# Patient Record
Sex: MALE | Race: WHITE | Employment: OTHER | URBAN - METROPOLITAN AREA
[De-identification: names, ages, dates, MRNs, and addresses within clinical notes are randomized per-mention and may not be internally consistent; named-entity substitution may affect disease eponyms.]

---

## 2017-01-01 ENCOUNTER — TELEPHONE (OUTPATIENT)
Dept: CARDIOLOGY CLINIC | Age: 72
End: 2017-01-01

## 2017-01-01 ENCOUNTER — APPOINTMENT (OUTPATIENT)
Dept: CT IMAGING | Age: 72
DRG: 064 | End: 2017-01-01
Attending: FAMILY MEDICINE
Payer: MEDICARE

## 2017-01-01 ENCOUNTER — OFFICE VISIT (OUTPATIENT)
Dept: CARDIOLOGY CLINIC | Age: 72
End: 2017-01-01

## 2017-01-01 ENCOUNTER — APPOINTMENT (OUTPATIENT)
Dept: CT IMAGING | Age: 72
DRG: 064 | End: 2017-01-01
Attending: EMERGENCY MEDICINE
Payer: MEDICARE

## 2017-01-01 ENCOUNTER — APPOINTMENT (OUTPATIENT)
Dept: GENERAL RADIOLOGY | Age: 72
DRG: 064 | End: 2017-01-01
Attending: EMERGENCY MEDICINE
Payer: MEDICARE

## 2017-01-01 ENCOUNTER — HOSPITAL ENCOUNTER (INPATIENT)
Age: 72
LOS: 1 days | DRG: 064 | End: 2017-06-02
Attending: EMERGENCY MEDICINE | Admitting: FAMILY MEDICINE
Payer: MEDICARE

## 2017-01-01 VITALS
DIASTOLIC BLOOD PRESSURE: 78 MMHG | BODY MASS INDEX: 30.61 KG/M2 | RESPIRATION RATE: 18 BRPM | SYSTOLIC BLOOD PRESSURE: 136 MMHG | HEIGHT: 67 IN | OXYGEN SATURATION: 98 % | HEART RATE: 84 BPM | WEIGHT: 195 LBS

## 2017-01-01 VITALS
SYSTOLIC BLOOD PRESSURE: 62 MMHG | BODY MASS INDEX: 29.05 KG/M2 | DIASTOLIC BLOOD PRESSURE: 37 MMHG | TEMPERATURE: 98.1 F | OXYGEN SATURATION: 80 % | WEIGHT: 177.25 LBS

## 2017-01-01 DIAGNOSIS — Z79.01 LONG TERM (CURRENT) USE OF ANTICOAGULANTS: ICD-10-CM

## 2017-01-01 DIAGNOSIS — I71.21 ANEURYSM, ASCENDING AORTA: ICD-10-CM

## 2017-01-01 DIAGNOSIS — S06.5XAA SDH (SUBDURAL HEMATOMA): Primary | ICD-10-CM

## 2017-01-01 DIAGNOSIS — I35.9 AORTIC VALVE DISORDER: ICD-10-CM

## 2017-01-01 DIAGNOSIS — Z95.2 S/P AVR (AORTIC VALVE REPLACEMENT): Primary | ICD-10-CM

## 2017-01-01 LAB
ANION GAP BLD CALC-SCNC: 8 MMOL/L (ref 5–15)
APPEARANCE UR: CLEAR
APPEARANCE UR: CLEAR
ARTERIAL PATENCY WRIST A: YES
ATRIAL RATE: 115 BPM
ATRIAL RATE: 125 BPM
BACTERIA URNS QL MICRO: NEGATIVE /HPF
BACTERIA URNS QL MICRO: NEGATIVE /HPF
BASE DEFICIT BLD-SCNC: 5 MMOL/L
BDY SITE: ABNORMAL
BILIRUB UR QL: NEGATIVE
BILIRUB UR QL: NEGATIVE
BUN SERPL-MCNC: 13 MG/DL (ref 6–20)
BUN/CREAT SERPL: 14 (ref 12–20)
CALCIUM SERPL-MCNC: 8.7 MG/DL (ref 8.5–10.1)
CALCULATED P AXIS, ECG09: 58 DEGREES
CALCULATED P AXIS, ECG09: 64 DEGREES
CALCULATED R AXIS, ECG10: 90 DEGREES
CALCULATED R AXIS, ECG10: 98 DEGREES
CALCULATED T AXIS, ECG11: 67 DEGREES
CALCULATED T AXIS, ECG11: 72 DEGREES
CHLORIDE SERPL-SCNC: 115 MMOL/L (ref 97–108)
CO2 SERPL-SCNC: 29 MMOL/L (ref 21–32)
COLOR UR: ABNORMAL
COLOR UR: ABNORMAL
CREAT SERPL-MCNC: 0.94 MG/DL (ref 0.7–1.3)
DIAGNOSIS, 93000: NORMAL
DIAGNOSIS, 93000: NORMAL
EPITH CASTS URNS QL MICRO: ABNORMAL /LPF
EPITH CASTS URNS QL MICRO: ABNORMAL /LPF
ERYTHROCYTE [DISTWIDTH] IN BLOOD BY AUTOMATED COUNT: 15.9 % (ref 11.5–14.5)
GAS FLOW.O2 O2 DELIVERY SYS: ABNORMAL L/MIN
GAS FLOW.O2 SETTING OXYMISER: 14 BPM
GLUCOSE SERPL-MCNC: 117 MG/DL (ref 65–100)
GLUCOSE UR STRIP.AUTO-MCNC: >1000 MG/DL
GLUCOSE UR STRIP.AUTO-MCNC: NEGATIVE MG/DL
HCO3 BLD-SCNC: 20.9 MMOL/L (ref 22–26)
HCT VFR BLD AUTO: 45.2 % (ref 36.6–50.3)
HGB BLD-MCNC: 14.9 G/DL (ref 12.1–17)
HGB UR QL STRIP: ABNORMAL
HGB UR QL STRIP: ABNORMAL
HYALINE CASTS URNS QL MICRO: ABNORMAL /LPF (ref 0–5)
KETONES UR QL STRIP.AUTO: 15 MG/DL
KETONES UR QL STRIP.AUTO: NEGATIVE MG/DL
LEUKOCYTE ESTERASE UR QL STRIP.AUTO: NEGATIVE
LEUKOCYTE ESTERASE UR QL STRIP.AUTO: NEGATIVE
MCH RBC QN AUTO: 28.6 PG (ref 26–34)
MCHC RBC AUTO-ENTMCNC: 33 G/DL (ref 30–36.5)
MCV RBC AUTO: 86.8 FL (ref 80–99)
NITRITE UR QL STRIP.AUTO: NEGATIVE
NITRITE UR QL STRIP.AUTO: NEGATIVE
O2/TOTAL GAS SETTING VFR VENT: 50 %
OSMOLALITY SERPL: 322 MOSM/KG H2O
OSMOLALITY UR: 55 MOSM/KG H2O
P-R INTERVAL, ECG05: 158 MS
P-R INTERVAL, ECG05: 162 MS
PCO2 BLD: 41.8 MMHG (ref 35–45)
PEEP RESPIRATORY: 5 CMH2O
PH BLD: 7.31 [PH] (ref 7.35–7.45)
PH UR STRIP: 6 [PH] (ref 5–8)
PH UR STRIP: 6.5 [PH] (ref 5–8)
PLATELET # BLD AUTO: 247 K/UL (ref 150–400)
PO2 BLD: 56 MMHG (ref 80–100)
POTASSIUM SERPL-SCNC: 4.1 MMOL/L (ref 3.5–5.1)
PROT UR STRIP-MCNC: NEGATIVE MG/DL
PROT UR STRIP-MCNC: NEGATIVE MG/DL
Q-T INTERVAL, ECG07: 324 MS
Q-T INTERVAL, ECG07: 396 MS
QRS DURATION, ECG06: 100 MS
QRS DURATION, ECG06: 96 MS
QTC CALCULATION (BEZET), ECG08: 467 MS
QTC CALCULATION (BEZET), ECG08: 547 MS
RBC # BLD AUTO: 5.21 M/UL (ref 4.1–5.7)
RBC #/AREA URNS HPF: ABNORMAL /HPF (ref 0–5)
RBC #/AREA URNS HPF: ABNORMAL /HPF (ref 0–5)
SAO2 % BLD: 86 % (ref 92–97)
SODIUM SERPL-SCNC: 152 MMOL/L (ref 136–145)
SODIUM UR-SCNC: 15 MMOL/L
SP GR UR REFRACTOMETRY: 1.01 (ref 1–1.03)
SP GR UR REFRACTOMETRY: 1.02 (ref 1–1.03)
SPECIMEN TYPE: ABNORMAL
TROPONIN I SERPL-MCNC: 0.08 NG/ML
TROPONIN I SERPL-MCNC: 0.08 NG/ML
UA: UC IF INDICATED,UAUC: ABNORMAL
UROBILINOGEN UR QL STRIP.AUTO: 0.2 EU/DL (ref 0.2–1)
UROBILINOGEN UR QL STRIP.AUTO: 0.2 EU/DL (ref 0.2–1)
VENTILATION MODE VENT: ABNORMAL
VENTRICULAR RATE, ECG03: 115 BPM
VENTRICULAR RATE, ECG03: 125 BPM
VT SETTING VENT: 500 ML
WBC # BLD AUTO: 14.8 K/UL (ref 4.1–11.1)
WBC URNS QL MICRO: ABNORMAL /HPF (ref 0–4)
WBC URNS QL MICRO: ABNORMAL /HPF (ref 0–4)

## 2017-01-01 PROCEDURE — 74011000258 HC RX REV CODE- 258: Performed by: FAMILY MEDICINE

## 2017-01-01 PROCEDURE — 84484 ASSAY OF TROPONIN QUANT: CPT | Performed by: FAMILY MEDICINE

## 2017-01-01 PROCEDURE — 74011000258 HC RX REV CODE- 258: Performed by: HOSPITALIST

## 2017-01-01 PROCEDURE — 84588 ASSAY OF VASOPRESSIN: CPT | Performed by: FAMILY MEDICINE

## 2017-01-01 PROCEDURE — 74011250636 HC RX REV CODE- 250/636: Performed by: INTERNAL MEDICINE

## 2017-01-01 PROCEDURE — 77030032490 HC SLV COMPR SCD KNE COVD -B

## 2017-01-01 PROCEDURE — 93306 TTE W/DOPPLER COMPLETE: CPT

## 2017-01-01 PROCEDURE — 81001 URINALYSIS AUTO W/SCOPE: CPT | Performed by: FAMILY MEDICINE

## 2017-01-01 PROCEDURE — 71010 XR CHEST PORT: CPT

## 2017-01-01 PROCEDURE — 96365 THER/PROPH/DIAG IV INF INIT: CPT

## 2017-01-01 PROCEDURE — 0BH17EZ INSERTION OF ENDOTRACHEAL AIRWAY INTO TRACHEA, VIA NATURAL OR ARTIFICIAL OPENING: ICD-10-PCS | Performed by: HOSPITALIST

## 2017-01-01 PROCEDURE — 74011250636 HC RX REV CODE- 250/636: Performed by: FAMILY MEDICINE

## 2017-01-01 PROCEDURE — 74011250636 HC RX REV CODE- 250/636: Performed by: EMERGENCY MEDICINE

## 2017-01-01 PROCEDURE — 65610000006 HC RM INTENSIVE CARE

## 2017-01-01 PROCEDURE — 82803 BLOOD GASES ANY COMBINATION: CPT

## 2017-01-01 PROCEDURE — 74011000250 HC RX REV CODE- 250: Performed by: EMERGENCY MEDICINE

## 2017-01-01 PROCEDURE — 74011250636 HC RX REV CODE- 250/636

## 2017-01-01 PROCEDURE — 74011250636 HC RX REV CODE- 250/636: Performed by: HOSPITALIST

## 2017-01-01 PROCEDURE — 96375 TX/PRO/DX INJ NEW DRUG ADDON: CPT

## 2017-01-01 PROCEDURE — 93005 ELECTROCARDIOGRAM TRACING: CPT

## 2017-01-01 PROCEDURE — 70450 CT HEAD/BRAIN W/O DYE: CPT

## 2017-01-01 PROCEDURE — 80048 BASIC METABOLIC PNL TOTAL CA: CPT | Performed by: FAMILY MEDICINE

## 2017-01-01 PROCEDURE — 83935 ASSAY OF URINE OSMOLALITY: CPT | Performed by: FAMILY MEDICINE

## 2017-01-01 PROCEDURE — 81001 URINALYSIS AUTO W/SCOPE: CPT | Performed by: EMERGENCY MEDICINE

## 2017-01-01 PROCEDURE — 84300 ASSAY OF URINE SODIUM: CPT | Performed by: FAMILY MEDICINE

## 2017-01-01 PROCEDURE — 83930 ASSAY OF BLOOD OSMOLALITY: CPT | Performed by: FAMILY MEDICINE

## 2017-01-01 PROCEDURE — 36415 COLL VENOUS BLD VENIPUNCTURE: CPT | Performed by: FAMILY MEDICINE

## 2017-01-01 PROCEDURE — 5A1935Z RESPIRATORY VENTILATION, LESS THAN 24 CONSECUTIVE HOURS: ICD-10-PCS | Performed by: HOSPITALIST

## 2017-01-01 PROCEDURE — 94002 VENT MGMT INPAT INIT DAY: CPT

## 2017-01-01 PROCEDURE — 85027 COMPLETE CBC AUTOMATED: CPT | Performed by: FAMILY MEDICINE

## 2017-01-01 PROCEDURE — 94003 VENT MGMT INPAT SUBQ DAY: CPT

## 2017-01-01 PROCEDURE — 99285 EMERGENCY DEPT VISIT HI MDM: CPT

## 2017-01-01 PROCEDURE — 74011000258 HC RX REV CODE- 258: Performed by: EMERGENCY MEDICINE

## 2017-01-01 PROCEDURE — 36600 WITHDRAWAL OF ARTERIAL BLOOD: CPT

## 2017-01-01 RX ORDER — POTASSIUM CHLORIDE 14.9 MG/ML
10 INJECTION INTRAVENOUS
Status: COMPLETED | OUTPATIENT
Start: 2017-01-01 | End: 2017-01-01

## 2017-01-01 RX ORDER — ZOLPIDEM TARTRATE 5 MG/1
TABLET ORAL
COMMUNITY

## 2017-01-01 RX ORDER — MORPHINE SULFATE 2 MG/ML
2 INJECTION, SOLUTION INTRAMUSCULAR; INTRAVENOUS
Status: DISCONTINUED | OUTPATIENT
Start: 2017-01-01 | End: 2017-01-01 | Stop reason: HOSPADM

## 2017-01-01 RX ORDER — SODIUM CHLORIDE 450 MG/100ML
1000 INJECTION, SOLUTION INTRAVENOUS ONCE
Status: COMPLETED | OUTPATIENT
Start: 2017-01-01 | End: 2017-01-01

## 2017-01-01 RX ORDER — ATORVASTATIN CALCIUM 10 MG/1
TABLET, FILM COATED ORAL DAILY
COMMUNITY

## 2017-01-01 RX ORDER — PROPOFOL 10 MG/ML
5-50 VIAL (ML) INTRAVENOUS
Status: DISCONTINUED | OUTPATIENT
Start: 2017-01-01 | End: 2017-01-01

## 2017-01-01 RX ORDER — DEXTROSE MONOHYDRATE 50 MG/ML
75 INJECTION, SOLUTION INTRAVENOUS CONTINUOUS
Status: DISCONTINUED | OUTPATIENT
Start: 2017-01-01 | End: 2017-01-01 | Stop reason: HOSPADM

## 2017-01-01 RX ORDER — ONDANSETRON 2 MG/ML
4 INJECTION INTRAMUSCULAR; INTRAVENOUS
Status: DISCONTINUED | OUTPATIENT
Start: 2017-01-01 | End: 2017-01-01 | Stop reason: HOSPADM

## 2017-01-01 RX ORDER — SODIUM CHLORIDE 0.9 % (FLUSH) 0.9 %
5 SYRINGE (ML) INJECTION EVERY 8 HOURS
Status: DISCONTINUED | OUTPATIENT
Start: 2017-01-01 | End: 2017-01-01 | Stop reason: HOSPADM

## 2017-01-01 RX ORDER — FENTANYL CITRATE 50 UG/ML
50 INJECTION, SOLUTION INTRAMUSCULAR; INTRAVENOUS
Status: DISCONTINUED | OUTPATIENT
Start: 2017-01-01 | End: 2017-01-01

## 2017-01-01 RX ORDER — SODIUM CHLORIDE 9 MG/ML
75 INJECTION, SOLUTION INTRAVENOUS CONTINUOUS
Status: DISCONTINUED | OUTPATIENT
Start: 2017-01-01 | End: 2017-01-01

## 2017-01-01 RX ORDER — LORAZEPAM 2 MG/ML
1 INJECTION INTRAMUSCULAR
Status: DISCONTINUED | OUTPATIENT
Start: 2017-01-01 | End: 2017-01-01 | Stop reason: HOSPADM

## 2017-01-01 RX ORDER — FENTANYL CITRATE 50 UG/ML
100 INJECTION, SOLUTION INTRAMUSCULAR; INTRAVENOUS ONCE
Status: COMPLETED | OUTPATIENT
Start: 2017-01-01 | End: 2017-01-01

## 2017-01-01 RX ORDER — LANOLIN ALCOHOL/MO/W.PET/CERES
CREAM (GRAM) TOPICAL
COMMUNITY

## 2017-01-01 RX ORDER — NALOXONE HYDROCHLORIDE 0.4 MG/ML
0.4 INJECTION, SOLUTION INTRAMUSCULAR; INTRAVENOUS; SUBCUTANEOUS AS NEEDED
Status: DISCONTINUED | OUTPATIENT
Start: 2017-01-01 | End: 2017-01-01 | Stop reason: HOSPADM

## 2017-01-01 RX ORDER — SODIUM CHLORIDE 0.9 % (FLUSH) 0.9 %
SYRINGE (ML) INJECTION
Status: COMPLETED
Start: 2017-01-01 | End: 2017-01-01

## 2017-01-01 RX ORDER — PROPOFOL 10 MG/ML
INJECTION, EMULSION INTRAVENOUS
Status: DISPENSED
Start: 2017-01-01 | End: 2017-01-01

## 2017-01-01 RX ORDER — PROPOFOL 10 MG/ML
5-50 VIAL (ML) INTRAVENOUS
Status: DISCONTINUED | OUTPATIENT
Start: 2017-01-01 | End: 2017-01-01 | Stop reason: HOSPADM

## 2017-01-01 RX ORDER — FENTANYL CITRATE 50 UG/ML
INJECTION, SOLUTION INTRAMUSCULAR; INTRAVENOUS
Status: COMPLETED
Start: 2017-01-01 | End: 2017-01-01

## 2017-01-01 RX ADMIN — SODIUM CHLORIDE 1000 ML: 450 INJECTION, SOLUTION INTRAVENOUS at 07:35

## 2017-01-01 RX ADMIN — PHENYLEPHRINE HYDROCHLORIDE 180 MCG/MIN: 10 INJECTION INTRAVENOUS at 15:47

## 2017-01-01 RX ADMIN — FENTANYL CITRATE 100 MCG: 50 INJECTION, SOLUTION INTRAMUSCULAR; INTRAVENOUS at 20:52

## 2017-01-01 RX ADMIN — POTASSIUM CHLORIDE 10 MEQ: 200 INJECTION, SOLUTION INTRAVENOUS at 01:55

## 2017-01-01 RX ADMIN — PHENYLEPHRINE HYDROCHLORIDE 100 MCG/MIN: 10 INJECTION INTRAVENOUS at 08:05

## 2017-01-01 RX ADMIN — SODIUM CHLORIDE 0.5 MG/MIN: 900 INJECTION, SOLUTION INTRAVENOUS at 21:23

## 2017-01-01 RX ADMIN — SODIUM CHLORIDE 500 MG: 900 INJECTION, SOLUTION INTRAVENOUS at 23:41

## 2017-01-01 RX ADMIN — PHENYLEPHRINE HYDROCHLORIDE 250 MCG/MIN: 10 INJECTION INTRAVENOUS at 11:11

## 2017-01-01 RX ADMIN — PHENYLEPHRINE HYDROCHLORIDE 230 MCG/MIN: 10 INJECTION INTRAVENOUS at 13:30

## 2017-01-01 RX ADMIN — LORAZEPAM 1 MG: 2 INJECTION INTRAMUSCULAR; INTRAVENOUS at 16:16

## 2017-01-01 RX ADMIN — POTASSIUM CHLORIDE 10 MEQ: 200 INJECTION, SOLUTION INTRAVENOUS at 00:52

## 2017-01-01 RX ADMIN — SODIUM CHLORIDE 5 MG/HR: 900 INJECTION, SOLUTION INTRAVENOUS at 20:20

## 2017-01-01 RX ADMIN — SODIUM CHLORIDE 75 ML/HR: 900 INJECTION, SOLUTION INTRAVENOUS at 22:49

## 2017-01-01 RX ADMIN — POTASSIUM CHLORIDE 10 MEQ: 200 INJECTION, SOLUTION INTRAVENOUS at 23:47

## 2017-01-01 RX ADMIN — POTASSIUM CHLORIDE 10 MEQ: 200 INJECTION, SOLUTION INTRAVENOUS at 22:49

## 2017-01-01 RX ADMIN — Medication 5 ML: at 06:44

## 2017-01-01 RX ADMIN — Medication 2 MG: at 16:16

## 2017-01-01 RX ADMIN — Medication 5 ML: at 23:46

## 2017-01-01 RX ADMIN — DEXTROSE MONOHYDRATE 75 ML/HR: 5 INJECTION, SOLUTION INTRAVENOUS at 04:49

## 2017-01-01 RX ADMIN — PROPOFOL 15 MCG/KG/MIN: 10 INJECTION, EMULSION INTRAVENOUS at 20:52

## 2017-01-01 RX ADMIN — SODIUM CHLORIDE 500 MG: 900 INJECTION, SOLUTION INTRAVENOUS at 11:12

## 2017-01-09 PROBLEM — Z79.01 LONG TERM (CURRENT) USE OF ANTICOAGULANTS: Status: ACTIVE | Noted: 2017-01-01

## 2017-01-09 PROBLEM — M15.9 OSTEOARTHRITIS OF MULTIPLE JOINTS: Status: RESOLVED | Noted: 2017-01-01 | Resolved: 2017-01-01

## 2017-01-09 PROBLEM — I35.9 AORTIC VALVE DISORDER: Status: ACTIVE | Noted: 2017-01-01

## 2017-01-09 PROBLEM — I71.21 ANEURYSM, ASCENDING AORTA: Status: ACTIVE | Noted: 2017-01-01

## 2017-01-09 PROBLEM — M15.9 OSTEOARTHRITIS OF MULTIPLE JOINTS: Status: ACTIVE | Noted: 2017-01-01

## 2017-01-09 PROBLEM — Z95.2 S/P AVR (AORTIC VALVE REPLACEMENT): Status: ACTIVE | Noted: 2017-01-01

## 2017-01-09 NOTE — MR AVS SNAPSHOT
Visit Information Date & Time Provider Department Dept. Phone Encounter #  
 1/9/2017  2:20 PM Yas Paula, [facility] [address] [place] [address] [phone] [id number] Follow-up Instructions Return in about 6 months (around 7/9/2017). Follow-up and Disposition History Upcoming Health Maintenance Date Due Hepatitis C Screening 1945 DTaP/Tdap/Td series (1 - Tdap) 4/6/1967 FOBT Q 1 YEAR AGE 50-75 4/6/1996 ZOSTER VACCINE AGE 60> 4/6/2006 GLAUCOMA SCREENING Q2Y 4/6/2011 Pneumococcal 65+ Low/Medium Risk (1 of 2 - PCV13) 4/6/2011 MEDICARE YEARLY EXAM 4/6/2011 INFLUENZA AGE 9 TO ADULT 8/1/2016 Allergies as of 1/9/2017  Review Complete On: 1/9/2017 By: Yas Paula MD  
 No Known Allergies Current Immunizations  Never Reviewed No immunizations on file. Not reviewed this visit You Were Diagnosed With   
  
 Codes Comments S/P AVR (aortic valve replacement)    -  Primary ICD-10-CM: C78.0 ICD-9-CM: V43.3 Aneurysm, ascending aorta (HCC)     ICD-10-CM: I71.2 ICD-9-CM: 170. 2 Aortic valve disorder     ICD-10-CM: I35.9 ICD-9-CM: 424.1 Long term (current) use of anticoagulants     ICD-10-CM: Z79.01 
ICD-9-CM: V58.61 Vitals BP Pulse Resp Height(growth percentile) Weight(growth percentile) SpO2  
 136/78 (BP 1 Location: Left arm, BP Patient Position: Sitting) 84 18 5' 7\" (1.702 m) 195 lb (88.5 kg) 98% BMI Smoking Status 30.54 kg/m2 Never Smoker Vitals History BMI and BSA Data Body Mass Index Body Surface Area 30.54 kg/m 2 2.05 m 2 Preferred Pharmacy Pharmacy Name Phone [id number] [address] [place] [phone] Your Updated Medication List  
  
   
This list is accurate as of: 1/9/17  2:59 PM.  Always use your most recent med list.  
  
  
  
  
 AMBIEN 5 mg tablet Generic drug:  zolpidem Take  by mouth nightly as needed for Sleep. atorvastatin 10 mg tablet Commonly known as:  LIPITOR Take  by mouth daily. B COMPLEX PO Take  by mouth. CENTRUM SILVER PO Take  by mouth. EXCEDRIN EXTRA STRENGTH PO Take  by mouth. FLUTICASONE NA  
50 mcg by Nasal route daily. OMEGA-3 PO Take  by mouth. SLO-NIACIN 500 mg Tber Generic drug:  niacin ER Take  by mouth. VITAMIN C PO Take  by mouth. VITAMIN E PO Take  by mouth. WARFARIN PO Take  by mouth. AS DIRECTED PER PCP. DOSAGE 6 - 7 MG DAILY. We Performed the Following AMB POC EKG ROUTINE W/ 12 LEADS, INTER & REP [51871 CPT(R)] Follow-up Instructions Return in about 6 months (around 7/9/2017). Introducing [facility] [place] [facility]! [facility] introduces [employer] patient portal. Now you can access parts of your medical record, email your doctor's office, and request medication refills online. 1. In your internet browser, go to [email or web address] 2. Click on the First Time User? Click Here link in the Sign In box. You will see the New Member Sign Up page. 3. Enter your [employer] Access Code exactly as it appears below. You will not need to use this code after youve completed the sign-up process. If you do not sign up before the expiration date, you must request a new code. · [employer] Access Code: [email or web address] Expires: 4/9/2017  2:57 PM 
 
4. Enter the last four digits of your Social Security Number (xxxx) and Date of Birth (mm/dd/yyyy) as indicated and click Submit. You will be taken to the next sign-up page. 5. Create a [employer] ID. This will be your [employer] login ID and cannot be changed, so think of one that is secure and easy to remember. 6. Create a [employer] password. You can change your password at any time. 7. Enter your Password Reset Question and Answer. This can be used at a later time if you forget your password. 8. Enter your e-mail address. You will receive e-mail notification when new information is available in [address] 9. Click Sign Up. You can now view and download portions of your medical record. 10. Click the Download Summary menu link to download a portable copy of your medical information. If you have questions, please visit the Frequently Asked Questions section of the [employer] website. Remember, [employer] is NOT to be used for urgent needs. For medical emergencies, dial 911. Now available from your iPhone and Android! Please provide this summary of care documentation to your next provider. Your primary care clinician is listed as [facility]. If you have any questions after today's visit, please call [phone].

## 2017-01-09 NOTE — PROGRESS NOTES
Ruben Parisi is a 80 y.o. male is here to establish local cardiac care. Hx Type I Aortic dissection with assoc AI, s/p Bentall repair with mechanical St Rudy AVR and aortic root graft 2004, on warfarin since. , followed by Cardiology in [place]. Last Echo 5/16 with LVEF 55-60, normal AVR, aortic root 3.7. Very active physically, no CV sx or complaints. Now residing here in area, sees Dr. Sourav Ayala as PCP. S/p multiple orthopedic procedures, s/p bilat TKR last summer, was on bridge lovenox, then back on warfarin. .  The patient denies chest pain/ shortness of breath, orthopnea, PND, LE edema, palpitations, syncope, presyncope or fatigue.        Patient Active Problem List    Diagnosis Date Noted    Aortic valve disorder 01/09/2017    S/P AVR (aortic valve replacement) 01/09/2017    Long term (current) use of anticoagulants 01/09/2017    Aneurysm, ascending aorta ([address] 01/09/2017      Kaylan Rome MD  Past Medical History   Diagnosis Date    Anxiety and depression     Aortic insufficiency      s/p AVR 2004    BPH (benign prostatic hyperplasia)     Carotid bruit     Cholelithiasis     Diverticulitis     DJD (degenerative joint disease)     Glomerulonephritis 2011    Hypothyroidism     IBS (irritable bowel syndrome)     Iron deficiency anemia     Migraine headache     Nephrolithiasis     Nephrotic syndrome      with renal failure, rx'd steroids x 7 mos    Pleurisy     Pneumonia     Thoracic ascending aortic aneurysm [facility])       Past Surgical History   Procedure Laterality Date    Hx aortic valve replacement  2004     St Rudy Mechanical + repair of thoracic ascending aorta    Hx turp  2002    Hx hernia repair      Hx hip replacement Right 2010    Hx knee replacement Bilateral 07/2016     No Known Allergies   Family History   Problem Relation Age of Onset    Heart Failure Mother       Social History     Social History    Marital status:      Spouse name: N/A    Number of children: N/A    Years of education: N/A     Occupational History    Not on file. Social History Main Topics    Smoking status: Never Smoker    Smokeless tobacco: Not on file    Alcohol use Not on file    Drug use: Not on file    Sexual activity: Not on file     Other Topics Concern    Not on file     Social History Narrative    No narrative on file      Current Outpatient Prescriptions   Medication Sig    WARFARIN SODIUM (WARFARIN PO) Take  by mouth. AS DIRECTED PER PCP. DOSAGE 6 - 7 MG DAILY.  FLUTICASONE PROPIONATE (FLUTICASONE NA) 50 mcg by Nasal route daily.  atorvastatin (LIPITOR) 10 mg tablet Take  by mouth daily.  zolpidem (AMBIEN) 5 mg tablet Take  by mouth nightly as needed for Sleep.  ASPIRIN/ACETAMINOPHEN/CAFFEINE (EXCEDRIN EXTRA STRENGTH PO) Take  by mouth.  FOLIC ACID/MULTIVIT-MIN/LUTEIN (CENTRUM SILVER PO) Take  by mouth.  ASCORBATE CALCIUM (VITAMIN C PO) Take  by mouth.  VITAMIN E ACETATE (VITAMIN E PO) Take  by mouth.  VITAMIN B COMPLEX (B COMPLEX PO) Take  by mouth.  niacin ER (SLO-NIACIN) 500 mg TbER Take  by mouth.  OMEGA-3/DHA/EPA/FISH OIL (OMEGA-3 PO) Take  by mouth. No current facility-administered medications for this visit. Review of Symptoms:    CONST  No weight change. No fever, chills, sweats    ENT No visual changes, URI sx, sore throat    CV  See HPI   RESP  No cough, or sputum, wheezing. Also see HPI   GI  No abdominal pain or change in bowel habits. No heartburn or dysphagia. No melena or rectal bleeding.   No dysuria, urgency, frequency, hematuria   MSKEL  No joint pain, swelling. No muscle pain. SKIN  No rash or lesions. NEURO  No headache, syncope, or seizure. No weakness, loss of sensation, or paresthesias. PSYCH  No low mood or depression  No anxiety. HE/LYMPH  No easy bruising, abnormal bleeding, or enlarged glands.         Physical ExamPhysical Exam:    Visit Vitals    /88 (BP 1 Location: Left arm, BP Patient Position: Sitting)    Pulse 84    Resp 18    Ht 5' 7\" (1.702 m)    Wt 195 lb (88.5 kg)    SpO2 98%    BMI 30.54 kg/m2     Gen: NAD  HEENT:  PERRL, throat clear  Neck: no mass or thyromegaly, no JVD   Heart:  Regular,Nl S1 prosthetic crisp  A2, II/VI murmur, no gallop or rub.   Lungs:  clear  Abdomen:   Soft, non-tender, bowel sounds are active.   Extremities:  No edema  Pulse: symmetric  Neuro: A&O times 3, WNL      Cardiographics    ECG: normal EKG, normal sinus rhythm, unchanged from previous tracings    CARDIAC TESTING:    ECHO 5/10/16--LVEF 55-60, D1, normal mechanical AVR (peak 8mm), mildly dilated aortic root (3.7)    CAROTID DOPPLER 2/14--normal/no plaque BICA, anteg vert bilat    STRESS MPI 2/09: Grzegorz 7:35, 94% apm, no sx, normal perfusion , LVEF 58%. Labs:   No results found for: NA, K, CL, CO2, AGAP, GLU, BUN, CREA, BUCR, GFRAA, GFRNA, CA, TBIL, TBILI, GPT, SGOT, AP, TP, ALB, GLOB, AGRAT, ALT  No results found for: CPK, CPKX, CPX  No results found for: CHOL, CHOLX, CHLST, CHOLV, 544031, HDL, LDL, DLDL, LDLC, DLDLP, TGL, TGLX, TRIGL, [id number], TRIGP, CHHD, CHHDX  No results found for this or any previous visit. Assessment:         Patient Active Problem List    Diagnosis Date Noted    Aortic valve disorder 01/09/2017    S/P AVR (aortic valve replacement) 01/09/2017    Long term (current) use of anticoagulants 01/09/2017    Aneurysm, ascending aorta ([address] 01/09/2017       Hx Type I Aortic dissection with assoc AI, s/p Bentall repair with mechanical St Rudy AVR and aortic root graft 2004, on warfarin since. , followed by Cardiology in [place]. Last Echo 5/16 with LVEF 55-60, normal AVR, aortic root 3.7. Very active physically, no CV sx or complaints. Now residing here in area, sees Dr. Maximilian Mohan as PCP. S/p multiple orthopedic procedures, s/p bilat TKR last summer, was on bridge lovenox, then back on warfarin. .     Plan:     Doing well with no adverse cardiac symptoms. Will enroll in \"remote\" coumadin clinic in [place] as he has been doing this for several yrs with Cardiologist. Lipids and labs followed by PCP. Continue current care and f/u in 6 months.     Domi Sanchez MD

## 2017-01-23 NOTE — TELEPHONE ENCOUNTER
Faxed a RX to [facility] for home INR monitoring to come to us now that he is a patient of Dr. Lakeisha Paulino.  Already has home monitoring with [facility] being sent to his previous cardiologist.

## 2017-01-31 NOTE — TELEPHONE ENCOUNTER
Called patient reference vascular screening. Patient says recently had screening done by another vascular specialist.

## 2017-01-31 NOTE — TELEPHONE ENCOUNTER
Re-faxed the form to [facility] today as they said today they still have not received our forms. I was given a new fax number of [phone] attn : Stacy Brantley today. Called patient and made him aware of what I am doing for him.

## 2017-02-06 NOTE — TELEPHONE ENCOUNTER
Chanel Danielle w/[facility] called stating pt told them he has now established care w/Dr Cassy Louise. They want to clarfiy how pt's PT/INR will be managed. They adjusted it today - but if pt now coming back to them, their physicians won't be able to continue doing his levels.

## 2017-02-07 NOTE — TELEPHONE ENCOUNTER
IRVIN says they need the patient's verbal ok for the [facility] office to monitor his INR and not Dr. Bhumika Ferguson office. IRVIN and I have left messages for the patient to call us but, as of this morning, he has not returned any of our calls.

## 2017-02-07 NOTE — TELEPHONE ENCOUNTER
Spoke with the patient and he said that he called [facility] and told them he approved Jamilah Jackson NP to monitor his INR's but they are still giving him the run around on fixing the issue. I told him to call me Thursday of this week if it is not fixed and I will switch his home monitoring company to [employer]. He agreed.

## 2017-06-01 PROBLEM — S06.5XAA SDH (SUBDURAL HEMATOMA): Status: ACTIVE | Noted: 2017-01-01

## 2017-06-02 NOTE — ED NOTES
Sent referral to guest house, spoke with Lupillo Freemanite at the [place], family is heading over there to get settled & will come back to check in with patient in ICU

## 2017-06-02 NOTE — PROGRESS NOTES
Notified that the patient . On bedside evaluation he is non-responsive, pupils dilated and fixed, heart sounds absent, no pulse or respirations.  Time of death 5:31 PM.

## 2017-06-02 NOTE — PROGRESS NOTES
0815: bedside report received from [facility].   0830: [employer] coordinator on the floor. GCS 6 at this time. Coordinator declined donation at this time. Wanted us to call at TOD.    0915: Dr. Anai Noriega at bedside. Myself and Dr. Anai Noriega along with wife and other family members met in the waiting area where Dr. Anai Noriega explained the gravity of the situation. Questions were answered.  paged for comfort. 1115: Dr. Dani Rocha into see wife. Spoke with her about care and wishes. Wife is along the lines of withdrawal of care. I explained that she had time to move towards this process. 1130: wife requesting to lay beside patient in the bed. Arranged the patient so that wife could lay beside him. 1250: wife at bedside. Requesting for Peggi Page the  to visit again. Very tearful, laying in the bed with the patient. 1600: Family ready for withdrawal.   1622: medicated with morphine and ativan in preparation for compassionate withdrawal.    1705: ETT removed. Family at bedside and with patient. 1731: TOD. 1744: Dr. Maisha Thompson paged. Will be to the floor to pronounce patient. 1802: [employer] called. Have NOT released body yet. 1830: Family in for last time to see patient. Wishes for  home to be [facility] in [place] [address].   [phone]: [facility]

## 2017-06-02 NOTE — PROGRESS NOTES
Patient seen with the family at the bedside. He is non-responsive, not breathing over vent. Understanding the poor prognosis, family decided to withdraw artificial life support later today after they spend some time with him. Plan for compassionate extubation when the family is ready. Comfort care orders placed.

## 2017-06-02 NOTE — PROGRESS NOTES
Reviewed chart and note patient intubated with discussion regarding compassionate withdrawal of care. Will sign off at this time as per neurosurgery, this does not appear to be a survivable event. Please re-consult if we can be of further assistance. Thanks. Dwain Siu M.CD.  CCC-SLP

## 2017-06-02 NOTE — ROUTINE PROCESS
Bedside and Verbal shift change report given to Leana Parker (oncoming nurse) by Carolyn Philip (offgoing nurse). Report included the following information SBAR, Kardex, Intake/Output, MAR, Recent Results, Cardiac Rhythm NSR and Alarm Parameters .

## 2017-06-02 NOTE — H&P
[address]   [address], [address]   HISTORY AND PHYSICAL       Name:  Judith Lowery   MR#:  [id number]   :  1945   Account #:  [de-identified]        Date of Adm:  2017       CHIEF COMPLAINT: Headache. HISTORY OF PRESENT ILLNESS: The patient is a 80-year-old   gentleman with past medical history of aortic insufficiency, thoracic   ascending aortic aneurysm, carotid bruits, DJD, diverticulitis, pleurisy,   BPH, hypothyroidism, depression, anxiety, cholelithiasis, IBS, migraine   headaches, nephrolithiasis, glomerulonephritis, nephrotic syndrome,   iron-deficiency anemia and history of a prosthetic heart valve who   presents to [facility] from [facility]   via helicopter secondary to chief complaint of headache. The patient is   intubated; thus, no history could be obtained from the patient. I called   patient's wife who is on her way in the car; thus, history was relied on   the EMS note and ER note. Apparently, the patient came to the ER   complaining of some headache and vomiting at [facility]. The patient had a rapid deterioration and was   intubated. Per ER note, the patient took tramadol and oxycodone last   night with enough improvement to be able to sleep last night but the   headache worsened today. No further history could be obtained from   the patient. The patient had a CT scan done and was found to have a   large subdural hematoma with midline shift. The patient had a rapid   decline at [facility], was intubated and was   transferred to [facility]. The patient was also given vitamin K   at [facility], had an INR of 2.1. The patient   was started on nicardipine drip and labetalol drip in the ER at [facility] and the hospitalist service was requested to admit the patient   for further management, evaluation. No further history could be   obtained.  The patient is on Coumadin for mechanical valve. PAST MEDICAL HISTORY: See above. HOME MEDICATIONS: Per chart the patient is on   1. Warfarin as directed. 2. Fluticasone. 3. Aspirin. 4. Folic acid. 5. Vitamin E.   6. Vitamin B.   7. Niacin. 8. Omega-3 fatty acid. REVIEW OF SYMPTOMS: Could not be obtained from the patient. SOCIAL HISTORY: Could not be obtained from the patient. FAMILY HISTORY: Could not be obtained. ALLERGIES: NO KNOWN DRUG ALLERGIES PER CHART. PHYSICAL EXAMINATION   VITALS: Temperature 98.6, pulse 94, respiratory rate 24, blood   pressure 126/56, pulse oximetry 98% on room air. GENERAL: Intubated. HEENT: Pupils dilated and nonresponsive. NECK: Supple. CHEST: Clear to auscultation bilaterally. CARDIOVASCULAR: Tachycardic. ABDOMEN: Soft, nontender, nondistended. Bowel sounds are   physiologic. EXTREMITIES: No clubbing, no cyanosis, no edema. NEUROPSYCHIATRIC: Very limited exam. Pupils completely   nonreactive. No corneal reflex. No gag or cough reflex. Minimal   extensor posturing . No other exam could be elicited. SKIN: Warm. LABORATORY: White count 21.8, hemoglobin 14.5, hematocrit 42.4,   platelets 991. INR 2.1. Sodium 139, potassium 2.7, chloride 103,   bicarbonate 23, anion gap 13, glucose 176, BUN 16, creatinine 0.95,   calcium 8.5, bilirubin total 0.7, ALT 27, AST 22, alkaline phosphatase   77, lipase 110. CT of the head without contrast shows moderate to large right subdural   hemorrhage with right to left midline shift of approximately mm,   effacement of the right intraventricular subfalcine herniation tentorial   herniation, ambient cistern remains patent. No intraventricular   hemorrhage. Chest x-ray shows no acute thoracic disease. EKG   shows normal sinus rhythm, there is no ST depression. ASSESSMENT AND PLAN   1. Subdural hematoma with midline shift. The patient appears to have   a nonsurvivable intracranial bleed.  Neurosurgery has been consulted   and I spoke with Dr. Cameron Long who reports that this patient has an   extremely poor prognosis. The patient is intubated, will be transferred   to intensive care unit. I am awaiting family arrival to discuss further   course of action with the family. Will hold Coumadin for now. The   patient was given vitamin K in the ER at [facility]. Will provide neurovascular checks, tight blood pressure   control with Cardene infusion. Will monitor in critical care setting and   further intervention will be per hospital course. Will try to keep SBP   less than 140. Will repeat a CT scan in the morning and may consider   getting an MRI after discussion with the family. Will provide supportive   care with oxygen support, continue the patient on ventilator and further   intervention will be per hospital course, reassess as needed and   continue to monitor. 2. History of prosthetic valve. Currently, off Coumadin secondary to a   large intracranial bleed. Will continue to monitor. 3. History of hypertension. The patient on Cardene drip at this point of   time. 4. History of hypothyroidism. Per patient's chart, he is not on Synthroid   at this point of time. Will discuss with family and restart if so needed. 5. Gastrointestinal/deep venous thrombosis prophylaxis. The patient   will be on SCDs. 6. The patient currently is FULL CODE. Awaiting family arrival. Again,   the patient has a very poor prognosis and a high risk for   decompensation and death. Will discuss with the family for possible   palliative care management.         Arlington Gitelman, MD MM / Sandeep Zavala   D:  06/01/2017   22:11   T:  06/01/2017   23:05   Job #:  [id number]

## 2017-06-02 NOTE — PROGRESS NOTES
Responded to page from [facility] who indicated that patient's wife was having a hard time and requested . Found spouse laying in patient's bed sobbing. Provided empathic presence while leading spouse in processing. Wife asked for prayer and expressed appreciation for pastoral support. Provided prayer and will continue to follow as needed throughout the day. MELANIE Yoder. Reese Kan

## 2017-06-02 NOTE — CONSULTS
[address] [facility] [address]   [address]       Name:  Shun Clifton   MR#:  [id number]   :  1945   Account #:  [de-identified]    Date of Consultation:  2017   Date of Adm:  2017       REASON FOR CONSULTATION: Subdural hematoma. HISTORY: The patient is a 78-year-old gentleman who was flown in   from [facility]. The history is from the chart as he is not   able to give any history himself. He came in with a chief complaint of   headache with no history of trauma. While there, after his CAT scan,   he decompensated and became unresponsive and vomited, and   they ended up intubating him, and flying him down to [address]. His CT scan showed a large right subdural hematoma with   shift which is acute in nature. He is on Coumadin for aortic valve. He was given vitamin K at   [facility]. His INR is 2.1 in the emergency room. I was called for   evaluation. PAST MEDICAL HISTORY: Per the chart is aortic insufficiency   thoracic ascending aorta, diverticulosis, pneumonia, pleurisy, BPH,   hypothyroidism, depression, anxiety, cholelithiasis, migraines,   nephrotic syndrome, anemia. SOCIAL HISTORY: Nonsmoker, no known alcohol use. FAMILY HISTORY: Heart failure in his mother. ALLERGIES: NO KNOWN DRUG ALLERGIES. MEDICATIONS:   1. COUMADIN. 2. Fluticasone. 3. Ambien. 4. Vitamins. REVIEW OF SYSTEMS: Unable to obtain. PHYSICAL EXAMINATION   GENERAL: Elderly gentleman sitting in no acute distress. HEENT: His head is normocephalic, atraumatic. NEUROLOGIC: His pupils are 5 mm and nonreactive. He has a   negative corneal reflex. He does not have any gag or cough to   stimulation of the endotracheal tube. Deep central pain has some   extensor posturing in his feet. No other movement. No eye opening. The rest of his neurologic exam is unable to obtain secondary to his   neurologic status.      CT scan done at [facility] I was able to view with [facility]   viewer which shows an acute subdural hematoma on the right which is   17 mm. Has an impressive midline shift to 15 mm. IMPRESSION: Large subdural hematoma. RECOMMENDATION: Given his anticoagulated state, his large   subdural and his poor neurologic exam, I do not think this is a   survivable hemorrhage. I do not think that surgery at this point was   going to change his outcome or bring him back to a good neurologic   state. At this point, I would transition to comfort care. The family is not   here but on the way.          Jenny Tapia MD MM / Deepak Salamanca   D:  06/01/2017   21:31   T:  06/01/2017   21:56   Job #:  [id number]

## 2017-06-02 NOTE — PROGRESS NOTES
Responded to page from nurse who informed that patient would be transitioning to comfort care later today and spouse wanted to meet with . Met with patient's close friends/neighbors who were at bedside, provided active listening as couple engaged in life review reflecting on patient as one who lived life to the fullest. Was led to wife in family waiting room who requested a prayer at bedside. Wife tearfully shared a wonderful marriage with patient through many years without argument and children because couple felt complete with each other. Spouse who identified as Giana Winston asked for a prayer that God would take patient's soul. Led in [address] inviting spouse to say a few words before  concluded with prayer. Wife expressed appreciation and added that she may ask for  again. Advised on availability. KAYE Troy

## 2017-06-02 NOTE — CDMP QUERY
Please clarify if this patient is being treated/managed for:    =>Brain edema with brain compression in the setting of acute subdural hemorrhage with vent support resulting in transition to comfort care  =>Other Explanation of clinical findings  =>Unable to Determine (no explanation of clinical findings)    The medical record reflects the following:      Clinical Indicators/Risk Factors: Pt transferred from [address] [place] [address] with dx. of subdural hemorrhage. CT head on adm resulted in \"Moderate to large right subdural hemorrhage with right to left midline shift of approximately 15 mm, effacement of the right lateral ventricle and subfalcine hernia\" and repeat CT(6/2) resulted in \"increased size of hematoma w/diffuse right cerebral edema related to the herniaton. .. Lova Mean new areas of intraventricular and intraparenchymal hematomas\". Lova Mean Lova Mean Pt with noted jerking movements in extremities per nursing PN but otherwise unresponsive. Treatment: Cardene alt with Jd for BP control, Monitor neuro status and VS, not surgical candidate w/transition to comfort care. Please clarify and document your clinical opinion in the progress notes and discharge summary including the definitive and/or presumptive diagnosis, (suspected or probable), related to the above clinical findings. Please include clinical findings supporting your diagnosis.     Thank you,    Chucky Francisco, RN, BSN, [facility], [address]  [phone]

## 2017-06-02 NOTE — PROGRESS NOTES
Neurosurgery Progress Note     Date: 2017  Admit Date: 2017     LOS: 1 day     Chief Complaint:  UNRESPONSIVE    Interval History: no change overnihgt      Subjective:     Review of systems unobtainable due to: altered mental status          Objective:     Patient Vitals for the past 8 hrs:   BP Temp Pulse Resp SpO2 Weight   17 0829 - - 97 14 97 % -   17 0700 (!) 76/56 (!) 100.6 °F (38.1 °C) (!) 106 14 95 % -   17 0630 111/75 - (!) 105 14 97 % -   17 0609 (!) 81/61 - 94 14 96 % -   17 0600 (!) 82/56 - 98 14 96 % 80.4 kg (177 lb 4 oz)   17 0520 - - 95 14 97 % -   17 0500 92/66 - (!) 103 14 97 % -   17 0430 98/68 - (!) 105 14 96 % -   17 0400 140/89 (!) 100.9 °F (38.3 °C) 98 23 97 % -   17 0349 107/71 - 90 16 97 % -   17 0300 119/72 - 96 24 97 % -   17 0200 143/80 - 87 17 97 % -       Temp (24hrs), Av.9 °F (37.2 °C), Min:97.1 °F (36.2 °C), Max:100.9 °F (38.3 °C)           Physical Exam:    General : NAD  Pupils 5mm NR, No corneals, no gag  No movement to painful stimuli, however spontaneous movement of LE - probably some posturing  Rest of neurologic exam unobtainable        Labs:    Recent Results (from the past 24 hour(s))   CBC WITH AUTOMATED DIFF    Collection Time: 17  5:37 PM   Result Value Ref Range    WBC 21.8 (H) 4.1 - 11.1 K/uL    RBC 5.12 4.10 - 5.70 M/uL    HGB 14.5 12.1 - 17.0 g/dL    HCT 42.4 36.6 - 50.3 %    MCV 82.8 80.0 - 99.0 FL    MCH 28.3 26.0 - 34.0 PG    MCHC 34.2 30.0 - 36.5 g/dL    RDW 16.0 (H) 11.5 - 14.5 %    PLATELET 604 191 - 764 K/uL    NEUTROPHILS 75 32 - 75 %    LYMPHOCYTES 16 12 - 49 %    MONOCYTES 8 5 - 13 %    EOSINOPHILS 1 0 - 7 %    BASOPHILS 0 0 - 1 %    ABS. NEUTROPHILS 16.1 (H) 1.8 - 8.0 K/UL    ABS. LYMPHOCYTES 3.5 0.8 - 3.5 K/UL    ABS. MONOCYTES 1.8 (H) 0.0 - 1.0 K/UL    ABS. EOSINOPHILS 0.3 0.0 - 0.4 K/UL    ABS.  BASOPHILS 0.0 0.0 - 0.1 K/UL   METABOLIC PANEL, COMPREHENSIVE    Collection Time: 06/01/17  5:37 PM   Result Value Ref Range    Sodium 139 136 - 145 mmol/L    Potassium 2.7 (LL) 3.5 - 5.1 mmol/L    Chloride 103 97 - 108 mmol/L    CO2 23 21 - 32 mmol/L    Anion gap 13 5 - 15 mmol/L    Glucose 176 (H) 65 - 100 mg/dL    BUN 16 7.0 - 18.0 MG/DL    Creatinine 0.95 0.70 - 1.30 MG/DL    BUN/Creatinine ratio 17 12 - 20      GFR est AA >60 >60 ml/min/1.73m2    GFR est non-AA >60 >60 ml/min/1.73m2    Calcium 8.5 8.5 - 10.1 MG/DL    Bilirubin, total 0.7 0.2 - 1.0 MG/DL    ALT (SGPT) 25 14 - 63 U/L    AST (SGOT) 22 15 - 37 U/L    Alk.  phosphatase 77 45 - 117 U/L    Protein, total 7.1 6.4 - 8.2 g/dL    Albumin 3.7 3.5 - 5.0 g/dL    Globulin 3.4 2.0 - 4.0 g/dL    A-G Ratio 1.1 1.1 - 2.2     PROTHROMBIN TIME + INR    Collection Time: 06/01/17  5:37 PM   Result Value Ref Range    INR 2.1 (H) 0.9 - 1.1      Prothrombin time 23.8 (H) 10.0 - 13.0 sec   PTT    Collection Time: 06/01/17  5:37 PM   Result Value Ref Range    aPTT 31.7 23.0 - 33.5 SEC   LIPASE    Collection Time: 06/01/17  5:37 PM   Result Value Ref Range    Lipase 110 73 - 393 U/L   FFP, ALLOCATE    Collection Time: 06/01/17  6:30 PM   Result Value Ref Range    Unit number [id number]     Blood component type FP24H,THAW     Unit division 00     Status of unit TRANSFUSED    EKG, 12 LEAD, INITIAL    Collection Time: 06/01/17  6:57 PM   Result Value Ref Range    Ventricular Rate 97 BPM    Atrial Rate 97 BPM    P-R Interval 146 ms    QRS Duration 96 ms    Q-T Interval 366 ms    QTC Calculation (Bezet) 464 ms    Calculated P Axis 53 degrees    Calculated R Axis 88 degrees    Calculated T Axis 74 degrees    Diagnosis       Normal sinus rhythm  Junctional ST depression, probably normal  Borderline ECG  No previous ECGs available  Confirmed by Saray Blanchard MD, --- ([id number]) on 6/2/2017 4:11:42 AM     EKG, 12 LEAD, INITIAL    Collection Time: 06/01/17  8:25 PM   Result Value Ref Range    Ventricular Rate 115 BPM    Atrial Rate 115 BPM    P-R Interval 162 ms    QRS Duration 96 ms    Q-T Interval 396 ms    QTC Calculation (Bezet) 547 ms    Calculated P Axis 64 degrees    Calculated R Axis 98 degrees    Calculated T Axis 72 degrees    Diagnosis       Sinus tachycardia  Nonspecific ST abnormality  Prolonged QT  When compared with ECG of 01-JUN-2017 18:57,  MANUAL COMPARISON REQUIRED, DATA IS UNCONFIRMED     EKG, 12 LEAD, INITIAL    Collection Time: 06/01/17  8:55 PM   Result Value Ref Range    Ventricular Rate 125 BPM    Atrial Rate 125 BPM    P-R Interval 158 ms    QRS Duration 100 ms    Q-T Interval 324 ms    QTC Calculation (Bezet) 467 ms    Calculated P Axis 58 degrees    Calculated R Axis 90 degrees    Calculated T Axis 67 degrees    Diagnosis       Sinus tachycardia with premature atrial complexes  When compared with ECG of 01-JUN-2017 20:25,  MANUAL COMPARISON REQUIRED, DATA IS UNCONFIRMED     POC G3 - PUL    Collection Time: 06/01/17  9:38 PM   Result Value Ref Range    FIO2 (POC) 50 %    pH (POC) 7.306 (L) 7.35 - 7.45      pCO2 (POC) 41.8 35 - 45 MMHG    pO2 (POC) 56 (L) 80 - 100 MMHG    HCO3 (POC) 20.9 (L) 22 - 26 MMOL/L    sO2 (POC) 86 (L) 92 - 97 %    Base deficit (POC) 5 mmol/L    Site LEFT RADIAL      Device: VENT      Mode ASSIST CONTROL      Tidal volume 500 ml    Set Rate 14 bpm    PEEP/CPAP (POC) 5 cmH2O    Allens test (POC) YES      Specimen type (POC) ARTERIAL     URINALYSIS W/MICROSCOPIC    Collection Time: 06/01/17  9:42 PM   Result Value Ref Range    Color YELLOW/STRAW      Appearance CLEAR CLEAR      Specific gravity 1.017 1.003 - 1.030      pH (UA) 6.0 5.0 - 8.0      Protein NEGATIVE  NEG mg/dL    Glucose >1000 (A) NEG mg/dL    Ketone 15 (A) NEG mg/dL    Bilirubin NEGATIVE  NEG      Blood SMALL (A) NEG      Urobilinogen 0.2 0.2 - 1.0 EU/dL    Nitrites NEGATIVE  NEG      Leukocyte Esterase NEGATIVE  NEG      WBC 0-4 0 - 4 /hpf    RBC 0-5 0 - 5 /hpf    Epithelial cells FEW FEW /lpf    Bacteria NEGATIVE  NEG /hpf    Hyaline cast 0-2 0 - 5 /lpf TROPONIN I    Collection Time: 06/01/17 11:32 PM   Result Value Ref Range    Troponin-I, Qt. 0.08 (H) <0.05 ng/mL   SODIUM, UR, RANDOM    Collection Time: 06/02/17  1:43 AM   Result Value Ref Range    Sodium urine, random 15 MMOL/L   OSMOLALITY, UR    Collection Time: 06/02/17  1:43 AM   Result Value Ref Range    Osmolality,urine 55 MOSM/kg H2O   URINALYSIS W/ REFLEX CULTURE    Collection Time: 06/02/17  1:43 AM   Result Value Ref Range    Color YELLOW/STRAW      Appearance CLEAR CLEAR      Specific gravity 1.007 1.003 - 1.030      pH (UA) 6.5 5.0 - 8.0      Protein NEGATIVE  NEG mg/dL    Glucose NEGATIVE  NEG mg/dL    Ketone NEGATIVE  NEG mg/dL    Bilirubin NEGATIVE  NEG      Blood MODERATE (A) NEG      Urobilinogen 0.2 0.2 - 1.0 EU/dL    Nitrites NEGATIVE  NEG      Leukocyte Esterase NEGATIVE  NEG      WBC 0-4 0 - 4 /hpf    RBC 0-5 0 - 5 /hpf    Epithelial cells FEW FEW /lpf    Bacteria NEGATIVE  NEG /hpf    UA:UC IF INDICATED CULTURE NOT INDICATED BY UA RESULT CNI     CBC W/O DIFF    Collection Time: 06/02/17  2:23 AM   Result Value Ref Range    WBC 14.8 (H) 4.1 - 11.1 K/uL    RBC 5.21 4.10 - 5.70 M/uL    HGB 14.9 12.1 - 17.0 g/dL    HCT 45.2 36.6 - 50.3 %    MCV 86.8 80.0 - 99.0 FL    MCH 28.6 26.0 - 34.0 PG    MCHC 33.0 30.0 - 36.5 g/dL    RDW 15.9 (H) 11.5 - 14.5 %    PLATELET 259 525 - 312 K/uL   METABOLIC PANEL, BASIC    Collection Time: 06/02/17  2:23 AM   Result Value Ref Range    Sodium 152 (H) 136 - 145 mmol/L    Potassium 4.1 3.5 - 5.1 mmol/L    Chloride 115 (H) 97 - 108 mmol/L    CO2 29 21 - 32 mmol/L    Anion gap 8 5 - 15 mmol/L    Glucose 117 (H) 65 - 100 mg/dL    BUN 13 6 - 20 MG/DL    Creatinine 0.94 0.70 - 1.30 MG/DL    BUN/Creatinine ratio 14 12 - 20      GFR est AA >60 >60 ml/min/1.73m2    GFR est non-AA >60 >60 ml/min/1.73m2    Calcium 8.7 8.5 - 10.1 MG/DL   OSMOLALITY, SERUM/PLASMA    Collection Time: 06/02/17  2:23 AM   Result Value Ref Range    Osmolality, serum/plasma 322 mOsm/kg H2O CT:  Large subdural with shift    Assessment:     Principal Problem:    SDH (subdural hematoma) ([facility] [address] (6/1/2017)        Plan:     I had a long talk with family about findings. Discussed large subdural, poor neurologic status. Discussed that this is not a survivable hemorrhage. We discussed waiting for best friend, then doing compassionate extubation. They all emphatically feel that he would not want to be kept inthis state      Diomedes Sinclair MD  35 minutes were spent with the patient, over half of which was face to face  counseling and coordination of care, discussing with nursing, obtaining history, examining patient and discussing treatment plans.

## 2017-06-02 NOTE — PROGRESS NOTES
0131: Patient draining large amounts of urine from shetty bag. Urine output 620 at 0100 and urine output now at 335 for this hour. Paged Hospitalist on call. 18: Spoke with Dr. Capo Casey and informed him of patient's urine output. MD to place orders. 0206: Patient constantly having myoclonic jerking of bilateral lower extremities vs. Spinal reflex. Dr. Kendrick Carrillo paged to make aware. Urine lab results pending. 6302: Spoke with Dr. Kendrick Carrillo and informed him about patient's abnormal jerking in bilateral legs. Also informed him about patient's large amount of urine output and labs were sent. No new orders received at this time. 0330: Patient taken down for CT scan of head with no events. VSS. Tolerated procedure well. 8092: Returned to ICU. No change in neuro status. VSS. Temperature 100.9 via bladder. Will cool patient with ice bags. 26: Paged Dr. Capo Casey regarding abnormal labs of sodium and to report results from urine labs. Urine output has slowed down and is 190 at this time. 36: Spoke with Dr. Capo Casey and informed him of lab results. New orders received to start D5W at 75 ml/hr. He informed me to continue to monitor urine. 0500: Urine output continues to be within normal limits. 0630: Patient vomited a medium amount of brown gastric secretions. Placed NGT to suction. Oral care performed and bed linen changed. 1963: BP in the 70's/ 50's. Paged Hospitalist on call. 9507: Spoke with Dr. Moira Grant and orders received to give 1 liter of 1/2 NS bolus and then if bp does not improve start neosynephrine drip. SHIFT SUMMARY: Patient remains unresponsive on the ventilator. Pupils fixed and dilated. No gag reflex present and patient is not breathing over the ventilator. Minimal withdrawal to lower extremities, no movement to any stimulus on upper extremities. Blood pressure labile. Patient went into Diabetes Insipidus, IVF changed to D5W for elevated sodium level.  Unable to assess CAM. Temp in 100 range after cooling with ice packs.

## 2017-06-02 NOTE — ED PROVIDER NOTES
HPI Comments: 71 y.o. male with past medical history significant for aortic insufficiency, thoracic ascending aortic aneurysm, carotid bruit, DJD, diverticulitis, pneumonia, pleurisy, BPH, hypothyroidism, depression, anxiety, cholelithiasis, IBS, migraine headache, nephrolithiasis, glomerulonephritis, nephrotic syndrome and iron deficiency anemia who presents from [facility] via EMS with chief complaint of headache. Per medical records, the pt was seen at [facility] this evening (6/1/2017) for a severe headache which has been ongoing since yesterday (5/31/2017). He was transferred to [facility] ED via helicopter for a subdural hemorrhage after reciveing CT scan. The pt was noted to be on 7.5 Nicardipine while at [facility] and pt received vitamin K while at [facility] for coumadin use. Pt is intubated and unable to provide medical hx at this time. Dr. Varghese Lutz has been consulted for neurosurgery who has informed that he will see the pt. There are no other acute medical concerns at this time. Full history, physical exam, and ROS unable to be obtained due to: Pt Intubated    Social hx: Non-smoker, No known ETOH use    PCP: Raquel Levi MD    Note written by Janett Betts, as dictated by Abe Avila MD 8:54 PM              The history is provided by medical records.         Past Medical History:   Diagnosis Date    Anxiety and depression     Aortic insufficiency     s/p AVR 2004    BPH (benign prostatic hyperplasia)     Carotid bruit     Cholelithiasis     Diverticulitis     DJD (degenerative joint disease)     Glomerulonephritis 2011    Hypothyroidism     IBS (irritable bowel syndrome)     Iron deficiency anemia     Migraine headache     Nephrolithiasis     Nephrotic syndrome     with renal failure, rx'd steroids x 7 mos    Pleurisy     Pneumonia     Thoracic ascending aortic aneurysm [facility])        Past Surgical History:   Procedure Laterality Date    HX AORTIC VALVE REPLACEMENT  2004    [facility] + repair of thoracic ascending aorta    HX HERNIA REPAIR      HX HIP REPLACEMENT Right 2010    HX KNEE REPLACEMENT Bilateral 07/2016    HX TURP  2002         Family History:   Problem Relation Age of Onset    Heart Failure Mother        Social History     Social History    Marital status:      Spouse name: N/A    Number of children: N/A    Years of education: N/A     Occupational History    Not on file. Social History Main Topics    Smoking status: Never Smoker    Smokeless tobacco: Not on file    Alcohol use Not on file    Drug use: Not on file    Sexual activity: Not on file     Other Topics Concern    Not on file     Social History Narrative         ALLERGIES: Review of patient's allergies indicates no known allergies. Review of Systems   Unable to perform ROS: Intubated       Vitals:    06/01/17 2025   Pulse: (!) 117   Resp: 22   SpO2: 96%            Physical Exam   Constitutional: He appears well-developed and well-nourished. No distress. HENT:   Head: Normocephalic and atraumatic. Right Ear: External ear normal.   Left Ear: External ear normal.   Nose: Nose normal.   Mouth/Throat: Oropharynx is clear and moist.   Ng tube present. Intubation tube put in place   Eyes: Conjunctivae and EOM are normal. No scleral icterus. No reaction to bilateral pupils with light   Neck: Normal range of motion. Neck supple. No JVD present. No tracheal deviation present. No thyromegaly present. Cardiovascular: Regular rhythm and normal heart sounds. Exam reveals no gallop and no friction rub. No murmur heard. Tachycardic rate and regular    Pulmonary/Chest: Effort normal and breath sounds normal. No respiratory distress. He has no wheezes. He has no rales. He exhibits no tenderness. Abdominal: Soft. Bowel sounds are normal. He exhibits no distension and no mass. There is no tenderness. There is no rebound and no guarding.    Genitourinary: Genitourinary Comments: Coronado catheter put in place   Musculoskeletal: Normal range of motion. He exhibits no edema or tenderness. Lymphadenopathy:     He has no cervical adenopathy. Neurological: He has normal strength. He displays no atrophy and no tremor. No cranial nerve deficit. He exhibits normal muscle tone. Coordination and gait normal.   Mid point fixed pupils  No response to painful stimulus     Skin: Skin is warm and dry. No rash noted. He is not diaphoretic. No erythema. Well healed mid-line chest incision    Psychiatric: He has a normal mood and affect. His behavior is normal. Judgment and thought content normal.   Nursing note and vitals reviewed. Note written by Janett Keller, as dictated by Krista Lange MD 8:54 PM    MDM  Number of Diagnoses or Management Options  SDH (subdural hematoma) [facility]):   Diagnosis management comments: Impression: 80-year-old male who is an air medevac transfer from [facility] for a spontaneous subdural hematoma. The patient is on Coumadin therapy for mechanical heart valve, INR was noted to be 2.1 he also had a low potassium at 3.3. The patient was intubated as he continued to have a downward course with decreased mental status and ultimately he became unresponsive. Dr. Robby Correa has been consulted by the physicians at [facility] and is accepting neurosurgeon. I discussed the case with him as well as the hospitalist who will be admitting to the hospital.    After NG tube placement the patient developed hypertension and tachycardia I have started labetalol to wean him off the Cardine drip, started propofol due to some agitation.     Critical Care  Total time providing critical care: (Total critical care time spend exclusive of procedures: 60 minutes  )    ED Course       Procedures      ED EKG interpretation:  Rhythm: sinus tachycardia;. Rate (approx.): 115 bpm; Axis: normal; ST/T wave: non-specific changes; Prolonged QT. Note written by Janett Ham, as dictated by Susanne Tyson MD 8:30 PM    ED EKG interpretation:  Rhythm: sinus tachycardia with premature atrial complexes.  Rate (approx.): 125 bpm; Axis: normal; ST/T wave: normal    Note written by Janett Ham, as dictated by Susanne Tyson MD 8:57 PM

## 2017-06-02 NOTE — PROGRESS NOTES
2308: Received patient from the ED for routine progression of care. Upon assessment, patient eyes do not open to any stimulus. Withdraws on lower extremities, no movement to any stimulus on upper extremities. Pupils fixed and dilated. Positive gag reflex noted when suctioning. Labetalol drip infusing. VSS.    2314: Labetalol drip stopped due to bp of 103/68. Will monitor.

## 2017-06-02 NOTE — PROGRESS NOTES
Intensivist    Catastrophic CNS bleed with shift and herniation. Not breathing above vent. No gag or corneal reflex. Pupils fixed. Some possible withdrawal in RLE.   Pt had been on coumadin for a mechanical valve    Neurologically devistated  Family awaiting arrival of other family members and then transition to comfort care  Wife apparently has declined eval for organ transplant    DNR  Withdrawal of support when family ready    Karl Sanches MD

## 2017-06-02 NOTE — ED TRIAGE NOTES
2024: Triage: Patient arrives as transfer from [facility] for subdural bleed. Patient arrived at [address] earlier today for \"worst headache of his life\". Received pain medication and went to CT, upon return from CT patient was obtunded with vomitus and subsequently intubated for airway protection. Patient is normally alert and oriented x4, lives independently and only hx is an aortic valve replacement for which he is anticoagulated. Patient arrives to [facility] intubated 7.5 ETT, 24 @ teeth, on 7.5mg of nicardipene.  sinus tach, 96% via ventilator with ETC02 39, /62. Patient has good color, and withdraws from painful stimulus to feet and legs. Patient does not withdraw from pain to upper extremeties. Pupils round, 5mm and non reactive. Prior to arrival patient received 1 run of K+, 5mg Vitamin K+, 20mg etomidate, and 100mg succs. En route with flight nurse patient received 2mg ativan, 80mg vec, 2.5mg versed and 50mcg fentanyl. 2040: Patient with acute rhythm change, HR 170s-180 SVT. Patient started on propofol gtt and 100mcg fentanyl, HR down to 126. EKG repeated. 2100: Orders received to repeat head CT by Dr. Snehal Mccullough who is in department assessing patient. Orders also received to stop propofol at this time for assessment. 2118: After further assessment orders received to hold off on head CT.     2130: 30 min after propofol gtt stopped, no purposeful movement of any extremeties, patient no longer has any movement to painful stimuli, pupils remain fixed and dialated. Will hold off on further sedation at this time. Call placed to to [facility] Rec[facility], spoke with Rody Hale. Coordinator will return call. 2213: Bailey from [facility] returned call stating they will visit in the morning and would like to be notified if decision to withdraw care is made. Dr. Snehal Mccullough speaking with patients wife via telephone who just arrived. 2233:  at bedside with family.  Family updated on situation. Verbalizing wishes to withdraw care if no change in mental status tomorrow morning. 2245: Patient left department with RN and RT on cardiac monitor for transportation to inpatient bed. Patient's VS at the time of transfer were BP 1133/73, 98% via ventilator, does not appear to be in pain at this time, 98.6 via bladder, HR 74 sinus rhythm on the monitor. Patient was intubated with fixed & dilated pupils at time of transfer. Labetolol gtt at 0.5 mg/min, NS at 75ml/hr, and 1 run of K+ infusing at time of transfer. Patient's family escorted up to ICU with patient. Family will head to guest house shortly. TRANSFER - OUT REPORT:    Verbal report given to SHAUN Salmeron(name) on [facility]  being transferred to ICU 14(unit) for routine progression of care       Report consisted of patients Situation, Background, Assessment and   Recommendations(SBAR). Information from the following report(s) SBAR, Kardex, ED Summary, [facility] and Recent Results was reviewed with the receiving nurse. Opportunity for questions and clarification was provided.

## 2017-06-02 NOTE — CDMP QUERY
Please clarify if this patient is being treated/managed for:    =>Hypernatremia in the setting of  Na level up to 152 since admission requiring IV infusion adjustment to D5 and lab monitoring  =>Other Explanation of clinical findings  =>Unable to Determine (no explanation of clinical findings)    The medical record reflects the following:    Clinical Indicators/Risk Factors: Na level increased to 152 on am labs (6/2). Treatment: IV fluids D5 cont rate. Monitor labs    Please clarify and document your clinical opinion in the progress notes and discharge summary including the definitive and/or presumptive diagnosis, (suspected or probable), related to the above clinical findings. Please include clinical findings supporting your diagnosis.     Thank you,      Deann Cleary, RN, BSN, [facility], [address]  [phone]

## 2017-06-02 NOTE — ROUTINE PROCESS
TRANSFER - IN REPORT:    Verbal report received from Bulmaro Shi (name) on Deradha Torres  being received from ED (unit) for routine progression of care      Report consisted of patients Situation, Background, Assessment and   Recommendations(SBAR). Information from the following report(s) SBAR, Kardex, Intake/Output, MAR, Recent Results, Cardiac Rhythm NSR and Alarm Parameters  was reviewed with the receiving nurse. Opportunity for questions and clarification was provided. Assessment completed upon patients arrival to unit and care assumed.

## 2017-06-02 NOTE — PROGRESS NOTES
Responded page indicating that patient was going to transition to comfort care. Accompanied wife and rest of family and friends as pt was extubated. Provided ministry of presence as patient passed, providing a prayer and playing a hymn softly for family. Family expressed much appreciation for staff. Provided bereavement brochure for wife. Family and friends supportive of each other. MELANIE Singer. Reese Kan

## 2017-06-02 NOTE — PROGRESS NOTES
Spiritual Care Assessment/Progress Notes    [facility] [id number]  [id number]    1945  71 y.o.  male    Patient Telephone Number: [phone] (home)   Faith Affiliation: Non Spiritism   Language: English   Extended Emergency Contact Information  Primary Emergency Contact: Cira York Phone: [phone]  Relation: Spouse   Patient Active Problem List    Diagnosis Date Noted    SDH (subdural hematoma) ([facility] [address] 06/01/2017    Aortic valve disorder 01/09/2017    S/P AVR (aortic valve replacement) 01/09/2017    Long term (current) use of anticoagulants 01/09/2017    [facility] 01/09/2017        Date: 6/1/2017       Level of Faith/Spiritual Activity:  []         Involved in abdi tradition/spiritual practice    []         Not involved in abdi tradition/spiritual practice  []         Spiritually oriented    []         Claims no spiritual orientation    []         seeking spiritual identity  []         Feels alienated from Shinto practice/tradition  []         Feels angry about Shinto practice/tradition  [x]         Spirituality/Shinto tradition is a resource for coping at this time.   []         Not able to assess due to medical condition    Services Provided Today:  [x]         crisis intervention    []         reading Scriptures  [x]         spiritual assessment    [x]         prayer  []         empathic listening/emotional support  []         rites and rituals (cite in comments)  []         life review     []         Shinto support  []         theological development   []         advocacy  []         ethical dialog     []         blessing  []         bereavement support    [x]         support to family  []         anticipatory grief support   []         help with AMD  []         spiritual guidance    []         meditation      Spiritual Care Needs  [x]         Emotional Support  []         Spiritual/Faith Care  [] Loss/Adjustment  []         Advocacy/Referral                /Ethics  []         No needs expressed at               this time  []         Other: (note in               comments)  Spiritual Care Plan  [x]         Follow up visits with               pt/family  []         Provide materials  []         Schedule sacraments  []         Contact Community               Clergy  []         Follow up as needed  []         Other: (note in               comments)     Comments: Responded to pastoral care referral for pt in ER 2; visited with pt's wife and a family friend; provided initial pastoral care including prayer for pt at bedside; wife understood the gravity of situation and coping with emotional distress as best as she could; Spiritual Care to follow up with family as needed. Rev.  Noemi Khan, Ph.D., M.Div., M.A.,   /Director of [id number] [facility]  Paging Service: [phone])

## 2017-06-02 NOTE — CONSULTS
Pt seen and examined, full consult to follow. Large right acute subdural hematoma with impressive shift. Intubated,   Pupils fixed and dilated, no corneal or gag reflex  Weak extensor posturing of the feet    On  Coumadin for aortic valve.       I do not think this is a survivable bleed    Would recommend comfort care

## 2017-06-02 NOTE — CDMP QUERY
Please clarify if this patient is being treated/managed for:    =>Hypokalemia-resolved in the setting of K level 2.7 on admission requiring KCL supplementation and lab monitoring  =>Other Explanation of clinical findings  =>Unable to Determine (no explanation of clinical findings)    The medical record reflects the following:    Clinical Indicators/Risk Factors: K level 2.7 on arrival to ED. Treatment: KCL supplementation in ED, Lab monitoring    Please clarify and document your clinical opinion in the progress notes and discharge summary including the definitive and/or presumptive diagnosis, (suspected or probable), related to the above clinical findings. Please include clinical findings supporting your diagnosis.     Thank you,    Miracle Navarro, RN, BSN, [facility], [address]  [phone]

## 2017-06-05 NOTE — DISCHARGE SUMMARY
Discharge Summary     Patient: Mily Tinoco MRN: [id number]  SSN: [id number]    YOB: 1945  Age: 71 y.o. Sex: male       Admit Date: 2017    Discharge Date: 2017     Admission Diagnoses: SDH (subdural hematoma) [facility])    Discharge Diagnoses:   Acute non-traumatic subdural hematoma  Brain herniation  Respiratory failure  Central diabetes insipidus  Hypernatremia  Hypokalemia   History of aortic valve replacement  Anticoagulated on warfarin     Discharge Condition:     Hospital Course: the patient presented with an acute subdural hematoma int he setting of anticoagulation. He was intubated. Neurosurgery was consulted. He had midline shift and was herniating. Prognosis was extremely poor. This was discussed with the family who decided to transition to comfort care. Artificial life support was withdrawn and the patient quickly passed. See progress note from the same day.     Signed By: Christel Cummings MD     2017

## 2017-06-07 LAB
OSMOLALITY SERPL: 312 MOSMOL/KG (ref 280–301)
VASOPRESSIN SERPL-MCNC: 4.3 PG/ML (ref 0–4.7)